# Patient Record
(demographics unavailable — no encounter records)

---

## 2025-02-04 NOTE — HISTORY OF PRESENT ILLNESS
[de-identified] : This is a retired  referred by his dermatologist for excision of a lipoma of the left groin region.  He has known about this for 1 to 2 years, it is asymptomatic but bothersome by its presence and there is no history of local trauma infection or surgery.  A similar lesion was removed from his right chest in the distant past.

## 2025-02-04 NOTE — SURGICAL HISTORY
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

## 2025-02-04 NOTE — PHYSICAL EXAM
[Normal Thyroid] : the thyroid was normal [Normal Breath Sounds] : Normal breath sounds [Normal Heart Sounds] : normal heart sounds [Normal Rate and Rhythm] : normal rate and rhythm [Abdominal Masses] : No abdominal masses [Abdomen Tenderness] : ~T ~M No abdominal tenderness [No HSM] : no hepatosplenomegaly [de-identified] : Healthy [de-identified] : No adenopathy [de-identified] : No palpable masses or suspicious areas in either breast region.  No axillary adenopathy bilaterally [de-identified] : No inguinal or femoral hernias or adenopathy.  2 cm subcutaneous lipoma in the lateral aspect of the left groin.  This is soft and mobile and does not change with position of straining.  No overlying skin changes.

## 2025-02-04 NOTE — ASSESSMENT
[FreeTextEntry1] : Subcutaneous lipoma in the left lateral groin region as noted above, this can be excised in the office under local anesthesia and the procedure was explained in full.  All his questions were answered and he understands and agrees.  An appropriate surgical consent was obtained and he will contact the office for scheduling.  He was also encouraged to return to Dr. Gauthier for skin screening examinations, at least on a yearly basis.

## 2025-03-09 NOTE — ASSESSMENT
[FreeTextEntry1] : No postoperative problems noted.  Local care and activity instructions were reviewed and all his questions were answered.  He will return here as need be.

## 2025-03-09 NOTE — HISTORY OF PRESENT ILLNESS
[de-identified] : The patient returns for his postoperative visit after excision of the lipoma of the left inguinal region.  He looks and feels well has no complaints in that regard.  He did not require any prescription analgesics.

## 2025-03-09 NOTE — PHYSICAL EXAM
[de-identified] : Small linear oblique incision in the lateral aspect of the left groin region is clean and dry, no evidence of hematoma or infection.